# Patient Record
Sex: MALE | Race: WHITE
[De-identification: names, ages, dates, MRNs, and addresses within clinical notes are randomized per-mention and may not be internally consistent; named-entity substitution may affect disease eponyms.]

---

## 2018-07-27 ENCOUNTER — HOSPITAL ENCOUNTER (OUTPATIENT)
Dept: HOSPITAL 75 - PREOP | Age: 73
End: 2018-07-27
Attending: RADIOLOGY
Payer: MEDICARE

## 2018-07-27 VITALS — BODY MASS INDEX: 33.18 KG/M2 | WEIGHT: 245 LBS | HEIGHT: 72 IN

## 2018-07-27 DIAGNOSIS — Z01.818: Primary | ICD-10-CM

## 2018-08-08 ENCOUNTER — HOSPITAL ENCOUNTER (OUTPATIENT)
Dept: HOSPITAL 75 - SDC | Age: 73
Discharge: HOME | End: 2018-08-08
Attending: RADIOLOGY
Payer: MEDICARE

## 2018-08-08 VITALS — SYSTOLIC BLOOD PRESSURE: 143 MMHG | DIASTOLIC BLOOD PRESSURE: 97 MMHG

## 2018-08-08 VITALS — SYSTOLIC BLOOD PRESSURE: 163 MMHG | DIASTOLIC BLOOD PRESSURE: 98 MMHG

## 2018-08-08 VITALS — SYSTOLIC BLOOD PRESSURE: 172 MMHG | DIASTOLIC BLOOD PRESSURE: 99 MMHG

## 2018-08-08 VITALS — BODY MASS INDEX: 33.86 KG/M2 | WEIGHT: 250 LBS | HEIGHT: 72 IN

## 2018-08-08 VITALS — DIASTOLIC BLOOD PRESSURE: 99 MMHG | SYSTOLIC BLOOD PRESSURE: 166 MMHG

## 2018-08-08 DIAGNOSIS — I10: ICD-10-CM

## 2018-08-08 DIAGNOSIS — G47.33: ICD-10-CM

## 2018-08-08 DIAGNOSIS — K21.9: ICD-10-CM

## 2018-08-08 DIAGNOSIS — C61: Primary | ICD-10-CM

## 2018-08-08 DIAGNOSIS — Z87.891: ICD-10-CM

## 2018-08-08 PROCEDURE — 87081 CULTURE SCREEN ONLY: CPT

## 2018-08-08 PROCEDURE — 77778 APPLY INTERSTIT RADIAT COMPL: CPT

## 2018-08-08 PROCEDURE — 77332 RADIATION TREATMENT AID(S): CPT

## 2018-08-08 PROCEDURE — 77370 RADIATION PHYSICS CONSULT: CPT

## 2018-08-08 PROCEDURE — 77318 BRACHYTX ISODOSE COMPLEX: CPT

## 2018-08-08 PROCEDURE — 76965 ECHO GUIDANCE RADIOTHERAPY: CPT

## 2018-08-08 PROCEDURE — 77470 SPECIAL RADIATION TREATMENT: CPT

## 2018-08-08 RX ADMIN — HYDROMORPHONE HYDROCHLORIDE PRN MG: 1 INJECTION, SOLUTION INTRAMUSCULAR; INTRAVENOUS; SUBCUTANEOUS at 14:39

## 2018-08-08 RX ADMIN — HYDROMORPHONE HYDROCHLORIDE PRN MG: 1 INJECTION, SOLUTION INTRAMUSCULAR; INTRAVENOUS; SUBCUTANEOUS at 14:49

## 2018-08-08 NOTE — PROGRESS NOTE-PRE OPERATIVE
Pre-Operative Progress Note


H&P Reviewed


The H&P was reviewed, patient examined and no changes noted.


Date Seen by Provider:  Aug 8, 2018


Time Seen by Provider:  11:28


Date H&P Reviewed:  Aug 8, 2018


Time H&P Reviewed:  11:28


Pre-Operative Diagnosis:  cT1c, PSA 5.08, Marge 7 (4+3) Prostate cancer











MYERS,DUANE E MD Aug 8, 2018 11:29

## 2018-08-08 NOTE — PROGRESS NOTE-POST OPERATIVE
Post-Operative Progess Note


Surgeon (s)/Assistant (s)


Surgeon


DUANE MYERS MD


Assistant:  LILLY BADILLO MD





Pre-Operative Diagnosis


cT1c, PSA 5.08, Marge 7 (4+3) Prostate cancer





Post-Operative Diagnosis





Same as pre-op





Procedure & Operative Findings


Date of Procedure


8/8/18


Procedure Performed/Findings


67% Attenuated Cesium 131 permanent prostate seed implant; Insertion of 

biodegradable hydrogel prostate rectal spacer utilizing the SpaceO[employer] system; 

cystogram


Prostate volume measured 41.5 cc


Anesthesia Type


General





Estimated Blood Loss


Estimated blood loss (mL):  Minimal





Specimens/Packing


Specimens Removed


None


Packing:  


None











MYERS,DUANE E MD Aug 8, 2018 14:37

## 2018-08-08 NOTE — DIAGNOSTIC IMAGING REPORT
INDICATION: Prostate brachytherapy.



PROCEDURE: Fluoroscopy was provided to Dr. Vargas for performance

of brachytherapy. 13 seconds of fluoroscopy was utilized.

Multiple radiation seed implants overlie the midline of the lower

pelvis.



IMPRESSION: Fluoroscopy during brachytherapy.



Dictated by: 



  Dictated on workstation # [id number]

## 2018-08-08 NOTE — ANESTHESIA-GENERAL POST-OP
General


Patient Condition


Mental Status/LOC:  Same as Preop


Cardiovascular:  Satisfactory


Nausea/Vomiting:  Absent


Respiratory:  Satisfactory


Pain:  Controlled


Complications:  Absent





Post Op Complications


Complications


None





Follow Up Care/Instructions


Patient Instructions


None needed.





Anesthesia/Patient Condition


Patient Condition


Patient is doing well, no complaints, stable vital signs, no apparent adverse 

anesthesia problems.











TWILA DOMINIQUE DO Aug 8, 2018 15:23

## 2018-08-08 NOTE — DISCHARGE INST-SIMPLE/STANDARD
Discharge Inst-Standard


Discharge Medications


New, Converted or Re-Newed RX:  Other (scripts called to [facility] in [place] 

on 8/07/18)





Patient Instructions/Follow Up


Plan of Care/Instructions/FU:  


1) Follow up for post implant scan at [facility] center 9/05/18 at 10:00


a.m.


2) Follow up with Dr. Marcus on 9/10/18 at 2:30 p.m.


Activity as Tolerated:  Yes


Discharge Diet:  No Restrictions


Other Inst to Patient


Patient wants catheter removal at Dr. Marcus's office. Need to confirm with Dr. Marcus either Friday 8/10 or Monday 8/13. Then call office [phone] to set 

up appointment.











MYERS,DUANE E MD Aug 8, 2018 11:37

## 2018-09-05 ENCOUNTER — HOSPITAL ENCOUNTER (OUTPATIENT)
Dept: HOSPITAL 75 - ONC | Age: 73
LOS: 25 days | Discharge: HOME | End: 2018-09-30
Attending: RADIOLOGY
Payer: MEDICARE

## 2018-09-05 DIAGNOSIS — C61: Primary | ICD-10-CM

## 2018-09-05 LAB — CREAT SERPL-MCNC: 0.99 MG/DL (ref 0.6–1.3)

## 2018-09-05 PROCEDURE — 77290 THER RAD SIMULAJ FIELD CPLX: CPT

## 2018-09-05 PROCEDURE — 36415 COLL VENOUS BLD VENIPUNCTURE: CPT

## 2018-09-05 PROCEDURE — 84520 ASSAY OF UREA NITROGEN: CPT

## 2018-09-05 PROCEDURE — 82565 ASSAY OF CREATININE: CPT
